# Patient Record
Sex: FEMALE | URBAN - METROPOLITAN AREA
[De-identification: names, ages, dates, MRNs, and addresses within clinical notes are randomized per-mention and may not be internally consistent; named-entity substitution may affect disease eponyms.]

---

## 2018-02-22 ENCOUNTER — OFFICE (OUTPATIENT)
Dept: URBAN - METROPOLITAN AREA CLINIC 101 | Facility: CLINIC | Age: 43
Setting detail: OPHTHALMOLOGY
End: 2018-02-22
Payer: COMMERCIAL

## 2018-02-22 ENCOUNTER — RX ONLY (RX ONLY)
Age: 43
End: 2018-02-22

## 2018-02-22 DIAGNOSIS — H52.7: ICD-10-CM

## 2018-02-22 DIAGNOSIS — H16.223: ICD-10-CM

## 2018-02-22 DIAGNOSIS — H52.13: ICD-10-CM

## 2018-02-22 PROCEDURE — 10004 FNA BX W/O IMG GDN EA ADDL: CPT | Performed by: OPTOMETRIST

## 2018-02-22 PROCEDURE — 10001 FITTING AND DISPENSING (ESTAB PATIENT): CPT | Performed by: OPTOMETRIST

## 2018-02-22 PROCEDURE — 92014 COMPRE OPH EXAM EST PT 1/>: CPT | Performed by: OPTOMETRIST

## 2018-02-22 ASSESSMENT — REFRACTION_MANIFEST
OD_VA3: 20/
OU_VA: 20/
OD_VA3: 20/
OS_VA2: 20/
OU_VA: 20/
OD_VA2: 20/
OS_VA3: 20/
OS_VA3: 20/
OD_VA2: 20/
OS_VA1: 20/
OS_VA1: 20/
OD_VA1: 20/
OS_VA3: 20/
OD_VA1: 20/
OD_VA2: 20/
OU_VA: 20/
OS_VA2: 20/
OS_VA1: 20/
OD_VA3: 20/
OD_VA1: 20/
OS_VA2: 20/

## 2018-02-22 ASSESSMENT — CONFRONTATIONAL VISUAL FIELD TEST (CVF)
OD_FINDINGS: FULL
OS_FINDINGS: FULL

## 2018-02-22 ASSESSMENT — SUPERFICIAL PUNCTATE KERATITIS (SPK)
OS_SPK: T
OD_SPK: T

## 2018-02-22 ASSESSMENT — REFRACTION_CURRENTRX
OS_OVR_VA: 20/
OD_OVR_VA: 20/
OS_OVR_VA: 20/
OS_OVR_VA: 20/
OD_OVR_VA: 20/
OD_OVR_VA: 20/

## 2018-02-22 ASSESSMENT — VISUAL ACUITY
OD_BCVA: 20/20
OS_BCVA: 20/20

## 2020-11-27 ENCOUNTER — NON-APPOINTMENT (OUTPATIENT)
Age: 45
End: 2020-11-27

## 2020-11-27 DIAGNOSIS — Z78.9 OTHER SPECIFIED HEALTH STATUS: ICD-10-CM

## 2020-11-27 DIAGNOSIS — L30.9 DERMATITIS, UNSPECIFIED: ICD-10-CM

## 2020-11-27 DIAGNOSIS — Z86.59 PERSONAL HISTORY OF OTHER MENTAL AND BEHAVIORAL DISORDERS: ICD-10-CM

## 2020-11-27 PROBLEM — Z00.00 ENCOUNTER FOR PREVENTIVE HEALTH EXAMINATION: Status: ACTIVE | Noted: 2020-11-27

## 2022-04-18 ENCOUNTER — APPOINTMENT (OUTPATIENT)
Dept: INTERNAL MEDICINE | Facility: CLINIC | Age: 47
End: 2022-04-18
Payer: COMMERCIAL

## 2022-04-18 ENCOUNTER — NON-APPOINTMENT (OUTPATIENT)
Age: 47
End: 2022-04-18

## 2022-04-18 VITALS — DIASTOLIC BLOOD PRESSURE: 90 MMHG | SYSTOLIC BLOOD PRESSURE: 146 MMHG

## 2022-04-18 DIAGNOSIS — H92.09 OTALGIA, UNSPECIFIED EAR: ICD-10-CM

## 2022-04-18 DIAGNOSIS — H69.80 OTHER SPECIFIED DISORDERS OF EUSTACHIAN TUBE, UNSPECIFIED EAR: ICD-10-CM

## 2022-04-18 PROCEDURE — 99213 OFFICE O/P EST LOW 20 MIN: CPT

## 2022-04-18 NOTE — REVIEW OF SYSTEMS
[Fever] : no fever [Chills] : no chills [Earache] : earache [Hearing Loss] : no hearing loss [Sore Throat] : no sore throat

## 2022-04-18 NOTE — ASSESSMENT
[FreeTextEntry1] : Otalgia/eustachian tube dysfunction–she was told this possibly could be allergy related or a mild viral infection.  She is going to try Claritin and/or Sudafed for the next few days.  She will follow-up with his no improvement.\par \par Elevated blood pressure–pressure is slightly elevated today.  It was slightly elevated 2 years ago when last in the office.  She will be using Sudafed which might have an effect on her pressure as well.  Her options were discussed.\par She will start Dyazide daily and follow-up in about 4-6 weeks and will also have lab work done at that time.

## 2022-04-18 NOTE — PHYSICAL EXAM
[No Acute Distress] : no acute distress [Normal Oropharynx] : the oropharynx was normal [No Lymphadenopathy] : no lymphadenopathy [de-identified] : Both TMs and canals appear unremarkable but the left is somewhat retracted.

## 2022-04-18 NOTE — HISTORY OF PRESENT ILLNESS
[de-identified] : 48 y/o female presents complaining of left ear congestion and pressure over the past 3 weeks.  She denies any fever chills or recent URI like symptoms.  Her hearing seems to be unaffected.

## 2022-06-07 ENCOUNTER — NON-APPOINTMENT (OUTPATIENT)
Age: 47
End: 2022-06-07

## 2022-06-07 ENCOUNTER — APPOINTMENT (OUTPATIENT)
Dept: INTERNAL MEDICINE | Facility: CLINIC | Age: 47
End: 2022-06-07
Payer: COMMERCIAL

## 2022-06-07 VITALS
BODY MASS INDEX: 22.5 KG/M2 | HEIGHT: 63 IN | SYSTOLIC BLOOD PRESSURE: 142 MMHG | WEIGHT: 127 LBS | DIASTOLIC BLOOD PRESSURE: 98 MMHG

## 2022-06-07 DIAGNOSIS — R03.0 ELEVATED BLOOD-PRESSURE READING, W/OUT DIAGNOSIS OF HYPERTENSION: ICD-10-CM

## 2022-06-07 DIAGNOSIS — J30.9 ALLERGIC RHINITIS, UNSPECIFIED: ICD-10-CM

## 2022-06-07 PROCEDURE — 99396 PREV VISIT EST AGE 40-64: CPT | Mod: 25

## 2022-06-07 PROCEDURE — 36415 COLL VENOUS BLD VENIPUNCTURE: CPT

## 2022-06-07 NOTE — HISTORY OF PRESENT ILLNESS
[de-identified] : 47-year-old presents for annual wellness visit as well as fasting labs and follow-up of recent elevated blood pressure.  She was started on Dyazide was also using allergy medication with Sudafed at that time.  She does continue to use it presently.  She has not checked her blood pressure outside the office.  She has had no problems with the Dyazide.  Also states that she had a tick bite that was identified as a lone star tick approximately 3 weeks ago.  It was removed and was not engorged.  She denies any symptoms presently including any rash at the site of tick bite.  She does ask about the possibility of alpha gal syndrome.

## 2022-06-07 NOTE — PHYSICAL EXAM
[No Acute Distress] : no acute distress [Normal TMs] : both tympanic membranes were normal [No Lymphadenopathy] : no lymphadenopathy [Clear to Auscultation] : lungs were clear to auscultation bilaterally [Normal] : normal rate, regular rhythm, normal S1 and S2 and no murmur heard [No Edema] : there was no peripheral edema [No Joint Swelling] : no joint swelling [No Rash] : no rash [No Focal Deficits] : no focal deficits [Alert and Oriented x3] : oriented to person, place, and time

## 2022-06-07 NOTE — ASSESSMENT
[FreeTextEntry1] : Her exam is unremarkable.\par Fasting labs including thyroid function were sent.\par \par Elevated blood pressure–she was recently started on Dyazide and pressure is elevated but the possibly could be a component of anxiety as well as the fact she is taking allergy medicine with Sudafed.  She was encouraged at this point to try switching to plain Zyrtec without Sudafed.  Also asked to start monitoring her blood pressure at home.  She will do that for 4 to 6 weeks and then follow-up if there is no improvement in the blood pressure.\par \par Allergic rhinitis–she has had relief from Zyrtec-D but is going to try plain Zyrtec or possibly one of the cortisone-based nasal sprays.\par \par Yearly GYN follow-up/mammography.

## 2022-06-07 NOTE — HEALTH RISK ASSESSMENT
[Never] : Never [Yes] : Yes [2 - 3 times a week (3 pts)] : 2 - 3  times a week (3 points) [1 or 2 (0 pts)] : 1 or 2 (0 points) [Never (0 pts)] : Never (0 points) [No] : In the past 12 months have you used drugs other than those required for medical reasons? No [0] : 2) Feeling down, depressed, or hopeless: Not at all (0) [PHQ-2 Negative - No further assessment needed] : PHQ-2 Negative - No further assessment needed [Audit-CScore] : 3 [[id number]] : 0

## 2022-06-07 NOTE — REVIEW OF SYSTEMS
[Fever] : no fever [Chills] : no chills [Fatigue] : no fatigue [Chest Pain] : no chest pain [Palpitations] : no palpitations [Shortness Of Breath] : no shortness of breath [Dyspnea on Exertion] : no dyspnea on exertion [Abdominal Pain] : no abdominal pain [Diarrhea] : diarrhea [Muscle Weakness] : no muscle weakness [Muscle Pain] : no muscle pain [Headache] : no headache [Dizziness] : no dizziness

## 2022-06-08 LAB
BASOPHILS # BLD AUTO: 0.03 K/UL
BASOPHILS NFR BLD AUTO: 0.7 %
CHOLEST SERPL-MCNC: 219 MG/DL
EOSINOPHIL # BLD AUTO: 0.13 K/UL
EOSINOPHIL NFR BLD AUTO: 3 %
HCT VFR BLD CALC: 42.2 %
HDLC SERPL-MCNC: 89 MG/DL
HGB BLD-MCNC: 14.8 G/DL
IMM GRANULOCYTES NFR BLD AUTO: 0.5 %
LDLC SERPL CALC-MCNC: 116 MG/DL
LYMPHOCYTES # BLD AUTO: 1.33 K/UL
LYMPHOCYTES NFR BLD AUTO: 30.4 %
MAN DIFF?: NORMAL
MCHC RBC-ENTMCNC: 32 PG
MCHC RBC-ENTMCNC: 35.1 GM/DL
MCV RBC AUTO: 91.1 FL
MONOCYTES # BLD AUTO: 0.41 K/UL
MONOCYTES NFR BLD AUTO: 9.4 %
NEUTROPHILS # BLD AUTO: 2.46 K/UL
NEUTROPHILS NFR BLD AUTO: 56 %
NONHDLC SERPL-MCNC: 130 MG/DL
PLATELET # BLD AUTO: 185 K/UL
RBC # BLD: 4.63 M/UL
RBC # FLD: 11.6 %
T4 SERPL-MCNC: 7.1 UG/DL
TRIGL SERPL-MCNC: 69 MG/DL
TSH SERPL-ACNC: 1.94 UIU/ML
WBC # FLD AUTO: 4.38 K/UL

## 2022-06-10 LAB
ALBUMIN SERPL ELPH-MCNC: 5.2 G/DL
ALP BLD-CCNC: 64 U/L
ALT SERPL-CCNC: 13 U/L
ANION GAP SERPL CALC-SCNC: 16 MMOL/L
AST SERPL-CCNC: 22 U/L
BILIRUB SERPL-MCNC: 1 MG/DL
BUN SERPL-MCNC: 10 MG/DL
CALCIUM SERPL-MCNC: 9.9 MG/DL
CHLORIDE SERPL-SCNC: 95 MMOL/L
CO2 SERPL-SCNC: 24 MMOL/L
CREAT SERPL-MCNC: 0.67 MG/DL
EGFR: 110 ML/MIN/1.73M2
GLUCOSE SERPL-MCNC: 87 MG/DL
POTASSIUM SERPL-SCNC: 3.7 MMOL/L
PROT SERPL-MCNC: 7.8 G/DL
SODIUM SERPL-SCNC: 135 MMOL/L

## 2022-06-19 ENCOUNTER — RX RENEWAL (OUTPATIENT)
Age: 47
End: 2022-06-19

## 2022-08-17 ENCOUNTER — RX RENEWAL (OUTPATIENT)
Age: 47
End: 2022-08-17

## 2022-12-09 ENCOUNTER — NON-APPOINTMENT (OUTPATIENT)
Age: 47
End: 2022-12-09

## 2022-12-09 ENCOUNTER — APPOINTMENT (OUTPATIENT)
Dept: FAMILY MEDICINE | Facility: CLINIC | Age: 47
End: 2022-12-09

## 2022-12-09 VITALS
SYSTOLIC BLOOD PRESSURE: 130 MMHG | DIASTOLIC BLOOD PRESSURE: 92 MMHG | TEMPERATURE: 98.5 F | HEART RATE: 73 BPM | OXYGEN SATURATION: 99 % | RESPIRATION RATE: 15 BRPM

## 2022-12-09 DIAGNOSIS — J04.0 ACUTE LARYNGITIS: ICD-10-CM

## 2022-12-09 PROCEDURE — 99213 OFFICE O/P EST LOW 20 MIN: CPT

## 2022-12-09 NOTE — PLAN
[FreeTextEntry1] : 47-year-old female for laryngitis.  Given minimal improvement in symptoms, Z-Gerhard and steroids prescribed.  Signs and symptoms warranting further eval advised.  All questions answered.  Patient voiced understanding and in agreement to above plan.  Return to clinic as recommended.

## 2022-12-09 NOTE — REVIEW OF SYSTEMS
[Fever] : no fever [Discharge] : no discharge [Earache] : no earache [Sore Throat] : sore throat [Chest Pain] : no chest pain [Shortness Of Breath] : no shortness of breath [Cough] : cough [Abdominal Pain] : no abdominal pain [Dysuria] : no dysuria [Headache] : no headache

## 2022-12-09 NOTE — PHYSICAL EXAM
[No Acute Distress] : no acute distress [Well Nourished] : well nourished [Well Developed] : well developed [Well-Appearing] : well-appearing [Normal Voice/Communication] : normal voice/communication [Normal Sclera/Conjunctiva] : normal sclera/conjunctiva [Normal Oropharynx] : the oropharynx was normal [Supple] : supple [No Respiratory Distress] : no respiratory distress  [Clear to Auscultation] : lungs were clear to auscultation bilaterally [Normal Rate] : normal rate  [Normal S1, S2] : normal S1 and S2 [Soft] : abdomen soft [Speech Grossly Normal] : speech grossly normal [Normal Affect] : the affect was normal [Normal Mood] : the mood was normal

## 2022-12-09 NOTE — HISTORY OF PRESENT ILLNESS
[FreeTextEntry8] : 48 y/o female presents with hoarse voice for four days, headache starting yesterday, and worsening congestion/cough.  No fever or shortness of breath noted.  Home test for COVID noted to be negative

## 2023-01-22 ENCOUNTER — RX RENEWAL (OUTPATIENT)
Age: 48
End: 2023-01-22

## 2023-07-30 ENCOUNTER — RX RENEWAL (OUTPATIENT)
Age: 48
End: 2023-07-30

## 2023-08-17 ENCOUNTER — APPOINTMENT (OUTPATIENT)
Dept: INTERNAL MEDICINE | Facility: CLINIC | Age: 48
End: 2023-08-17
Payer: COMMERCIAL

## 2023-08-17 ENCOUNTER — TRANSCRIPTION ENCOUNTER (OUTPATIENT)
Age: 48
End: 2023-08-17

## 2023-08-17 ENCOUNTER — NON-APPOINTMENT (OUTPATIENT)
Age: 48
End: 2023-08-17

## 2023-08-17 VITALS
DIASTOLIC BLOOD PRESSURE: 80 MMHG | HEIGHT: 63 IN | BODY MASS INDEX: 23.74 KG/M2 | SYSTOLIC BLOOD PRESSURE: 132 MMHG | WEIGHT: 134 LBS

## 2023-08-17 DIAGNOSIS — Z00.00 ENCOUNTER FOR GENERAL ADULT MEDICAL EXAMINATION W/OUT ABNORMAL FINDINGS: ICD-10-CM

## 2023-08-17 PROCEDURE — 36415 COLL VENOUS BLD VENIPUNCTURE: CPT

## 2023-08-17 PROCEDURE — 99396 PREV VISIT EST AGE 40-64: CPT | Mod: 25

## 2023-08-17 NOTE — REVIEW OF SYSTEMS
[Fever] : no fever [Chills] : no chills [Fatigue] : no fatigue [Chest Pain] : no chest pain [Palpitations] : no palpitations [Shortness Of Breath] : no shortness of breath [Dyspnea on Exertion] : no dyspnea on exertion [Abdominal Pain] : no abdominal pain [Headache] : no headache [Dizziness] : no dizziness

## 2023-08-17 NOTE — PHYSICAL EXAM
[No Acute Distress] : no acute distress [No Lymphadenopathy] : no lymphadenopathy [Clear to Auscultation] : lungs were clear to auscultation bilaterally [Normal] : normal rate, regular rhythm, normal S1 and S2 and no murmur heard [No Edema] : there was no peripheral edema [Non Tender] : non-tender [No Joint Swelling] : no joint swelling [No Rash] : no rash [No Focal Deficits] : no focal deficits [Alert and Oriented x3] : oriented to person, place, and time

## 2023-08-17 NOTE — ASSESSMENT
[FreeTextEntry1] : Her exam is unremarkable. Fasting labs including lipid profile and thyroid functions were sent. Yearly follow-up with GYN/mammography. Colonoscopy was also discussed.  She will be calling to make the initial appointment for evaluation in the near future.

## 2023-08-17 NOTE — HISTORY OF PRESENT ILLNESS
[de-identified] : 48-year-old female presents for annual wellness visit and follow-up fasting labs. She has no significant past medical history. Has been generally well without any recent illness.

## 2023-08-17 NOTE — HEALTH RISK ASSESSMENT
[Yes] : Yes [2 - 4 times a month (2 pts)] : 2-4 times a month (2 points) [1 or 2 (0 pts)] : 1 or 2 (0 points) [Never (0 pts)] : Never (0 points) [0] : 2) Feeling down, depressed, or hopeless: Not at all (0) [PHQ-2 Negative - No further assessment needed] : PHQ-2 Negative - No further assessment needed [Never] : Never [Audit-CScore] : 2 [[id number]] : 0

## 2023-08-18 LAB
ALBUMIN SERPL ELPH-MCNC: 4.9 G/DL
ALP BLD-CCNC: 54 U/L
ALT SERPL-CCNC: 9 U/L
ANION GAP SERPL CALC-SCNC: 14 MMOL/L
AST SERPL-CCNC: 19 U/L
BILIRUB SERPL-MCNC: 1 MG/DL
BUN SERPL-MCNC: 9 MG/DL
CALCIUM SERPL-MCNC: 9.8 MG/DL
CHLORIDE SERPL-SCNC: 95 MMOL/L
CHOLEST SERPL-MCNC: 201 MG/DL
CO2 SERPL-SCNC: 23 MMOL/L
CREAT SERPL-MCNC: 0.64 MG/DL
EGFR: 110 ML/MIN/1.73M2
GLUCOSE SERPL-MCNC: 87 MG/DL
HDLC SERPL-MCNC: 83 MG/DL
LDLC SERPL CALC-MCNC: 107 MG/DL
NONHDLC SERPL-MCNC: 118 MG/DL
POTASSIUM SERPL-SCNC: 3.8 MMOL/L
PROT SERPL-MCNC: 7.6 G/DL
SODIUM SERPL-SCNC: 133 MMOL/L
T4 SERPL-MCNC: 7 UG/DL
TRIGL SERPL-MCNC: 62 MG/DL
TSH SERPL-ACNC: 1.33 UIU/ML

## 2023-09-14 DIAGNOSIS — T30.0 BURN OF UNSPECIFIED BODY REGION, UNSPECIFIED DEGREE: ICD-10-CM

## 2023-09-14 RX ORDER — SILVER SULFADIAZINE 10 MG/G
1 CREAM TOPICAL TWICE DAILY
Qty: 1 | Refills: 0 | Status: ACTIVE | COMMUNITY
Start: 2023-09-14 | End: 1900-01-01

## 2023-10-28 ENCOUNTER — RX RENEWAL (OUTPATIENT)
Age: 48
End: 2023-10-28

## 2023-12-26 ENCOUNTER — APPOINTMENT (OUTPATIENT)
Dept: INTERNAL MEDICINE | Facility: CLINIC | Age: 48
End: 2023-12-26
Payer: COMMERCIAL

## 2023-12-26 ENCOUNTER — NON-APPOINTMENT (OUTPATIENT)
Age: 48
End: 2023-12-26

## 2023-12-26 VITALS — SYSTOLIC BLOOD PRESSURE: 136 MMHG | DIASTOLIC BLOOD PRESSURE: 74 MMHG

## 2023-12-26 DIAGNOSIS — L30.9 DERMATITIS, UNSPECIFIED: ICD-10-CM

## 2023-12-26 DIAGNOSIS — R21 RASH AND OTHER NONSPECIFIC SKIN ERUPTION: ICD-10-CM

## 2023-12-26 PROCEDURE — 99213 OFFICE O/P EST LOW 20 MIN: CPT

## 2023-12-26 RX ORDER — PREDNISONE 20 MG/1
20 TABLET ORAL
Qty: 10 | Refills: 0 | Status: ACTIVE | COMMUNITY
Start: 2023-12-26 | End: 1900-01-01

## 2023-12-26 RX ORDER — CLOBETASOL PROPIONATE 0.5 MG/G
0.05 CREAM TOPICAL TWICE DAILY
Qty: 30 | Refills: 0 | Status: ACTIVE | COMMUNITY
Start: 2023-12-26 | End: 1900-01-01

## 2023-12-26 RX ORDER — ALPRAZOLAM 0.25 MG/1
0.25 TABLET ORAL
Qty: 30 | Refills: 0 | Status: ACTIVE | COMMUNITY
Start: 2023-12-26 | End: 1900-01-01

## 2023-12-26 NOTE — HISTORY OF PRESENT ILLNESS
[FreeTextEntry8] : Presents with an eruption/rash that started on her right elbow and now involves a good portion of her right arm as well.  Started approximately 2 weeks ago.  It has been extremely itchy.  There is no pain involved.  Recently she has been using Lotrimin cream with no change.

## 2023-12-26 NOTE — ASSESSMENT
[FreeTextEntry1] : Allergic eruption-no obvious etiology.  Has been using OTC Lotrimin without any effect. She is going to start prednisone 40 mg for 5 days.  Also given Temovate cream will use twice daily.  Will follow-up in 1 week if not improved.  Occasional anxiety-given Xanax 0.25 mg to use as needed.  She has used this in the past without problems.

## 2023-12-26 NOTE — PHYSICAL EXAM
[No Acute Distress] : no acute distress [de-identified] : Right forearm elbow and upper arm area with a widespread eruption consisting of small raised macular lesions.  No pustules

## 2024-01-26 ENCOUNTER — RX RENEWAL (OUTPATIENT)
Age: 49
End: 2024-01-26

## 2024-05-20 ENCOUNTER — RX RENEWAL (OUTPATIENT)
Age: 49
End: 2024-05-20

## 2024-05-20 RX ORDER — TRIAMTERENE AND HYDROCHLOROTHIAZIDE 25; 37.5 MG/1; MG/1
37.5-25 TABLET ORAL
Qty: 90 | Refills: 0 | Status: ACTIVE | COMMUNITY
Start: 2022-04-18 | End: 1900-01-01

## 2024-07-01 ENCOUNTER — APPOINTMENT (OUTPATIENT)
Dept: GASTROENTEROLOGY | Facility: CLINIC | Age: 49
End: 2024-07-01
Payer: COMMERCIAL

## 2024-07-01 VITALS
OXYGEN SATURATION: 100 % | HEART RATE: 85 BPM | BODY MASS INDEX: 23.04 KG/M2 | DIASTOLIC BLOOD PRESSURE: 95 MMHG | SYSTOLIC BLOOD PRESSURE: 151 MMHG | HEIGHT: 63 IN | WEIGHT: 130 LBS

## 2024-07-01 DIAGNOSIS — Z02.82 ENCOUNTER FOR ADOPTION SERVICES: ICD-10-CM

## 2024-07-01 DIAGNOSIS — R03.0 ELEVATED BLOOD-PRESSURE READING, W/OUT DIAGNOSIS OF HYPERTENSION: ICD-10-CM

## 2024-07-01 DIAGNOSIS — F41.9 ANXIETY DISORDER, UNSPECIFIED: ICD-10-CM

## 2024-07-01 DIAGNOSIS — Z12.11 ENCOUNTER FOR SCREENING FOR MALIGNANT NEOPLASM OF COLON: ICD-10-CM

## 2024-07-01 PROCEDURE — 99203 OFFICE O/P NEW LOW 30 MIN: CPT

## 2024-07-01 RX ORDER — SODIUM SULFATE, POTASSIUM SULFATE AND MAGNESIUM SULFATE 1.6; 3.13; 17.5 G/177ML; G/177ML; G/177ML
17.5-3.13-1.6 SOLUTION ORAL
Qty: 2 | Refills: 0 | Status: ACTIVE | COMMUNITY
Start: 2024-07-01 | End: 1900-01-01

## 2024-08-13 ENCOUNTER — RX RENEWAL (OUTPATIENT)
Age: 49
End: 2024-08-13

## 2024-08-14 ENCOUNTER — APPOINTMENT (OUTPATIENT)
Dept: ORTHOPEDIC SURGERY | Facility: CLINIC | Age: 49
End: 2024-08-14

## 2024-08-14 VITALS
DIASTOLIC BLOOD PRESSURE: 90 MMHG | WEIGHT: 130 LBS | HEIGHT: 62 IN | HEART RATE: 76 BPM | SYSTOLIC BLOOD PRESSURE: 136 MMHG | BODY MASS INDEX: 23.92 KG/M2

## 2024-08-14 DIAGNOSIS — S69.82XS OTHER SPECIFIED INJURIES OF LEFT WRIST, HAND AND FINGER(S), SEQUELA: ICD-10-CM

## 2024-08-14 DIAGNOSIS — Z86.79 PERSONAL HISTORY OF OTHER DISEASES OF THE CIRCULATORY SYSTEM: ICD-10-CM

## 2024-08-14 PROCEDURE — 99203 OFFICE O/P NEW LOW 30 MIN: CPT

## 2024-08-14 PROCEDURE — 73100 X-RAY EXAM OF WRIST: CPT | Mod: LT

## 2024-08-15 PROBLEM — Z86.79 HISTORY OF HYPERTENSION: Status: RESOLVED | Noted: 2024-08-15 | Resolved: 2024-08-15

## 2024-08-15 PROBLEM — S69.82XS TFCC (TRIANGULAR FIBROCARTILAGE COMPLEX) INJURY, LEFT, SEQUELA: Status: ACTIVE | Noted: 2024-08-14

## 2024-08-15 NOTE — CONSULT LETTER
[Dear  ___] : Dear  [unfilled], [Consult Letter:] : I had the pleasure of evaluating your patient, [unfilled]. [Please see my note below.] : Please see my note below. [Consult Closing:] : Thank you very much for allowing me to participate in the care of this patient.  If you have any questions, please do not hesitate to contact me. [Sincerely,] : Sincerely, [FreeTextEntry3] : Merrick Rousseau, III, MD

## 2024-08-15 NOTE — ADDENDUM
[FreeTextEntry1] : This note was written by Shameka Arroyo on 08/15/2024 acting as scribe for Merrick Rousseau III, MD

## 2024-08-15 NOTE — HISTORY OF PRESENT ILLNESS
[de-identified] : The patient comes in today with complaints of pain to her left wrist.  She states two weeks ago, she was at the [email or web address] [place] and she was moving a luggage cart and developed ulnar sided wrist pain.  They recommended ice and rest, which she did.  She is still having some complaints.  The patient states the onset/injury occurred 08/29/2024.  This injury is not work related.  The patient states the pain is localized.  The patient describes the pain as dull. [2] : a current pain level of 2/10 [de-identified] : bending

## 2024-08-15 NOTE — PHYSICAL EXAM
[Normal] : Gait: normal [de-identified] : Right Wrist: Wrist: Range of Motion in Degrees:                                                 Claimant:                 Normal:  Dorsiflexion: (Active)                90-degrees 90-degrees  Dorsiflexion: (Passive)                 90-degrees 90-degrees  Palmar flexion: (Active)               90-degrees 90-degrees  Palmar flexion: (Passive)               90-degrees 90-degrees  Radial & ulnar deviation(Active) 30-degrees 30-degrees  Radial & ulnar deviation(Passive) 30-degrees 30-degrees  Pronation/Supination:(Active)     0-180 degrees 0-180 degrees  Pronation/Supination:(Passive)           0-180 degrees 0-180 degrees    No instability.  No volar, radial or ulnar tenderness.  No dorsal, radial or ulnar tenderness.  Negative Tinel's.  Negative Phalen's.   No tenderness at the TFCC.  No tenderness with ulnar deviation.  No tenderness of the first dorsal compartment.  Negative Finkelstein's.  No tenderness at the level of the basal joint.  Negative grind.  No muscular atrophy.  No tenderness with flexion and extension of the wrist.  No motor or sensory deficits.  2+ radial and ulnar pulses.  Skin is intact.  No rashes, scars or lesions.    Left Wrist: Wrist:  Range of Motion in Degrees:                                                  Claimant:                 Normal: Dorsiflexion: (Active)                90-degrees 90-degrees Dorsiflexion: (Passive)                 90-degrees 90-degrees Palmar flexion: (Active)               90-degrees 90-degrees Palmar flexion: (Passive)               90-degrees 90-degrees Radial & ulnar deviation(Active) 30-degrees 30-degrees Radial & ulnar deviation(Passive) 30-degrees 30-degrees Pronation/Supination:(Active)     0-180 degrees 0-180 degrees Pronation/Supination:(Passive)           0-180 degrees 0-180 degrees  No instability.  Tenderness at the ulnocarpal junction.  Tenderness at the TFCC.   Tenderness with ulnar deviation.  No volar, radial or ulnar tenderness.  No dorsal, radial or ulnar tenderness.  Negative Tinel's.  Negative Phalen's.   No tenderness of the first dorsal compartment.  Negative Finkelstein's.  No tenderness at the level of the basal joint.  Negative grind.  No muscular atrophy.  No motor or sensory deficits.  2+ radial and ulnar pulses.  Skin is intact.  No rashes, scars or lesions.    [de-identified] : Station:  Normal. [de-identified] : Appearance:  Well-developed, well-nourished female in no acute distress.   [de-identified] : Radiographs, two views of the left wrist taken in the office today, show no obvious osseous abnormalities.

## 2024-08-15 NOTE — HISTORY OF PRESENT ILLNESS
[de-identified] : The patient comes in today with complaints of pain to her left wrist.  She states two weeks ago, she was at the [employer] [place] and she was moving a luggage cart and developed ulnar sided wrist pain.  They recommended ice and rest, which she did.  She is still having some complaints.  The patient states the onset/injury occurred 08/29/2024.  This injury is not work related.  The patient states the pain is localized.  The patient describes the pain as dull. [2] : a current pain level of 2/10 [de-identified] : bending

## 2024-08-15 NOTE — DISCUSSION/SUMMARY
[de-identified] : The patient presents with an ulnar strain of the left wrist with possible TFCC injury.  At this time, I recommend rest, ice and topical anti-inflammatory.  Because her pain is minimum at this point, I will not place her in a splint.  She will be reassessed in 3-4 weeks.

## 2024-08-15 NOTE — DISCUSSION/SUMMARY
[de-identified] : The patient presents with an ulnar strain of the left wrist with possible TFCC injury.  At this time, I recommend rest, ice and topical anti-inflammatory.  Because her pain is minimum at this point, I will not place her in a splint.  She will be reassessed in 3-4 weeks.

## 2024-08-15 NOTE — PHYSICAL EXAM
[Normal] : Gait: normal [de-identified] : Right Wrist: Wrist: Range of Motion in Degrees:                                                 Claimant:                 Normal:  Dorsiflexion: (Active)                90-degrees 90-degrees  Dorsiflexion: (Passive)                 90-degrees 90-degrees  Palmar flexion: (Active)               90-degrees 90-degrees  Palmar flexion: (Passive)               90-degrees 90-degrees  Radial & ulnar deviation(Active) 30-degrees 30-degrees  Radial & ulnar deviation(Passive) 30-degrees 30-degrees  Pronation/Supination:(Active)     0-180 degrees 0-180 degrees  Pronation/Supination:(Passive)           0-180 degrees 0-180 degrees    No instability.  No volar, radial or ulnar tenderness.  No dorsal, radial or ulnar tenderness.  Negative Tinel's.  Negative Phalen's.   No tenderness at the TFCC.  No tenderness with ulnar deviation.  No tenderness of the first dorsal compartment.  Negative Finkelstein's.  No tenderness at the level of the basal joint.  Negative grind.  No muscular atrophy.  No tenderness with flexion and extension of the wrist.  No motor or sensory deficits.  2+ radial and ulnar pulses.  Skin is intact.  No rashes, scars or lesions.    Left Wrist: Wrist:  Range of Motion in Degrees:                                                  Claimant:                 Normal: Dorsiflexion: (Active)                90-degrees 90-degrees Dorsiflexion: (Passive)                 90-degrees 90-degrees Palmar flexion: (Active)               90-degrees 90-degrees Palmar flexion: (Passive)               90-degrees 90-degrees Radial & ulnar deviation(Active) 30-degrees 30-degrees Radial & ulnar deviation(Passive) 30-degrees 30-degrees Pronation/Supination:(Active)     0-180 degrees 0-180 degrees Pronation/Supination:(Passive)           0-180 degrees 0-180 degrees  No instability.  Tenderness at the ulnocarpal junction.  Tenderness at the TFCC.   Tenderness with ulnar deviation.  No volar, radial or ulnar tenderness.  No dorsal, radial or ulnar tenderness.  Negative Tinel's.  Negative Phalen's.   No tenderness of the first dorsal compartment.  Negative Finkelstein's.  No tenderness at the level of the basal joint.  Negative grind.  No muscular atrophy.  No motor or sensory deficits.  2+ radial and ulnar pulses.  Skin is intact.  No rashes, scars or lesions.    [de-identified] : Station:  Normal. [de-identified] : Appearance:  Well-developed, well-nourished female in no acute distress.   [de-identified] : Radiographs, two views of the left wrist taken in the office today, show no obvious osseous abnormalities.

## 2024-08-31 ENCOUNTER — RX RENEWAL (OUTPATIENT)
Age: 49
End: 2024-08-31

## 2024-09-11 ENCOUNTER — APPOINTMENT (OUTPATIENT)
Dept: ORTHOPEDIC SURGERY | Facility: CLINIC | Age: 49
End: 2024-09-11

## 2024-10-01 ENCOUNTER — RESULT REVIEW (OUTPATIENT)
Age: 49
End: 2024-10-01

## 2024-10-01 ENCOUNTER — APPOINTMENT (OUTPATIENT)
Dept: GASTROENTEROLOGY | Facility: AMBULATORY MEDICAL SERVICES | Age: 49
End: 2024-10-01
Payer: COMMERCIAL

## 2024-10-01 ENCOUNTER — TRANSCRIPTION ENCOUNTER (OUTPATIENT)
Age: 49
End: 2024-10-01

## 2024-10-01 PROCEDURE — 45380 COLONOSCOPY AND BIOPSY: CPT

## 2024-11-11 ENCOUNTER — APPOINTMENT (OUTPATIENT)
Dept: INTERNAL MEDICINE | Facility: CLINIC | Age: 49
End: 2024-11-11
Payer: COMMERCIAL

## 2024-11-11 VITALS
SYSTOLIC BLOOD PRESSURE: 128 MMHG | DIASTOLIC BLOOD PRESSURE: 80 MMHG | HEIGHT: 62 IN | WEIGHT: 136 LBS | BODY MASS INDEX: 25.03 KG/M2

## 2024-11-11 VITALS — WEIGHT: 136 LBS | BODY MASS INDEX: 24.88 KG/M2 | SYSTOLIC BLOOD PRESSURE: 126 MMHG | DIASTOLIC BLOOD PRESSURE: 80 MMHG

## 2024-11-11 DIAGNOSIS — Z00.00 ENCOUNTER FOR GENERAL ADULT MEDICAL EXAMINATION W/OUT ABNORMAL FINDINGS: ICD-10-CM

## 2024-11-11 PROCEDURE — 99396 PREV VISIT EST AGE 40-64: CPT

## 2024-11-11 PROCEDURE — 36415 COLL VENOUS BLD VENIPUNCTURE: CPT

## 2024-11-12 LAB
ALBUMIN SERPL ELPH-MCNC: 4.4 G/DL
ALP BLD-CCNC: 59 U/L
ALT SERPL-CCNC: 8 U/L
ANION GAP SERPL CALC-SCNC: 14 MMOL/L
AST SERPL-CCNC: 20 U/L
BASOPHILS # BLD AUTO: 0.02 K/UL
BASOPHILS NFR BLD AUTO: 0.4 %
BILIRUB SERPL-MCNC: 0.8 MG/DL
BUN SERPL-MCNC: 12 MG/DL
CALCIUM SERPL-MCNC: 9.4 MG/DL
CHLORIDE SERPL-SCNC: 101 MMOL/L
CHOLEST SERPL-MCNC: 194 MG/DL
CO2 SERPL-SCNC: 23 MMOL/L
CREAT SERPL-MCNC: 0.62 MG/DL
EGFR: 110 ML/MIN/1.73M2
EOSINOPHIL # BLD AUTO: 0.22 K/UL
EOSINOPHIL NFR BLD AUTO: 4.1 %
GLUCOSE SERPL-MCNC: 79 MG/DL
HCT VFR BLD CALC: 41.3 %
HDLC SERPL-MCNC: 79 MG/DL
HGB BLD-MCNC: 13.5 G/DL
IMM GRANULOCYTES NFR BLD AUTO: 0.4 %
LDLC SERPL CALC-MCNC: 107 MG/DL
LYMPHOCYTES # BLD AUTO: 1.44 K/UL
LYMPHOCYTES NFR BLD AUTO: 26.9 %
MAN DIFF?: NORMAL
MCHC RBC-ENTMCNC: 31.5 PG
MCHC RBC-ENTMCNC: 32.7 G/DL
MCV RBC AUTO: 96.5 FL
MONOCYTES # BLD AUTO: 0.53 K/UL
MONOCYTES NFR BLD AUTO: 9.9 %
NEUTROPHILS # BLD AUTO: 3.12 K/UL
NEUTROPHILS NFR BLD AUTO: 58.3 %
NONHDLC SERPL-MCNC: 115 MG/DL
PLATELET # BLD AUTO: 184 K/UL
POTASSIUM SERPL-SCNC: 5.1 MMOL/L
PROT SERPL-MCNC: 7 G/DL
RBC # BLD: 4.28 M/UL
RBC # FLD: 12.4 %
SODIUM SERPL-SCNC: 138 MMOL/L
TRIGL SERPL-MCNC: 45 MG/DL
TSH SERPL-ACNC: 2.05 UIU/ML
WBC # FLD AUTO: 5.35 K/UL

## 2025-04-08 ENCOUNTER — RX RENEWAL (OUTPATIENT)
Age: 50
End: 2025-04-08

## 2025-07-15 ENCOUNTER — RX RENEWAL (OUTPATIENT)
Age: 50
End: 2025-07-15